# Patient Record
Sex: MALE | Race: WHITE | NOT HISPANIC OR LATINO | Employment: UNEMPLOYED | URBAN - METROPOLITAN AREA
[De-identification: names, ages, dates, MRNs, and addresses within clinical notes are randomized per-mention and may not be internally consistent; named-entity substitution may affect disease eponyms.]

---

## 2020-07-30 ENCOUNTER — OFFICE VISIT (OUTPATIENT)
Dept: URBAN - METROPOLITAN AREA CLINIC 35 | Facility: CLINIC | Age: 30
End: 2020-07-30

## 2020-07-30 VITALS
BODY MASS INDEX: 21.48 KG/M2 | HEIGHT: 69 IN | OXYGEN SATURATION: 98 % | WEIGHT: 145 LBS | TEMPERATURE: 98.5 F | HEART RATE: 74 BPM | SYSTOLIC BLOOD PRESSURE: 112 MMHG | DIASTOLIC BLOOD PRESSURE: 60 MMHG

## 2020-07-30 RX ORDER — MULTIVITAMIN
1 TABLET TABLET ORAL ONCE A DAY
Qty: 30 | Status: ACTIVE | COMMUNITY

## 2020-07-30 RX ORDER — B-COMPLEX WITH VITAMIN C
AS DIRECTED TABLET ORAL
Status: ACTIVE | COMMUNITY

## 2020-07-30 RX ORDER — L.ACID,FERM,PLA,RHA/B.BIF,LONG 126 MG
AS DIRECTED TABLET, DELAYED AND EXTENDED RELEASE ORAL
Status: ACTIVE | COMMUNITY

## 2020-07-30 NOTE — HPI-MIGRATED HPI
;   ;     Change in bowel habits : Patient states his bowel habits changed two years ago.  He now has loose stools.  The only change he can recall is not smoking and not drinking anymore. He will have 2-3 BMs a day.;   Diarrhea : Patient is a 30 y/o  male who presents today for a consultation for diarrhea. He admits symptoms began 2 years ago.  He reports 3 bowel movements a day with no strain. Stools alternate between semi formed and watery. He admits that his stools are thin. He denies any episodes of rectal bleeding but admits mucus present. He admits that he has added fiber to his diet and it has mildly helped symptoms. He reports fecal urgency and denies any fecal incontinece.   No abd pain with BMs.  He denies nocturnal diarrhea.  He feels like he cannot gain weight.  He will lose weight easily, and his appetite is low.  He does not have any normal stools.  Fiber has helped make stools larger but not firmer.  He feels he has had loose stools for two years.  Patient reports prior to 2 years ago he would have 1-2 bowel movements a day with normal formed stools.   ;

## 2020-07-30 NOTE — EXAM-MIGRATED EXAMINATIONS
GENERAL APPEARANCE: - alert, in no acute distress, well developed, well nourished;   HEAD: - normocephalic, atraumatic;   EYES: - sclera anicteric bilaterally;   EARS: - normal;   ORAL CAVITY: - mucosa moist, MP2;   THROAT: - clear;   NECK/THYROID: - neck supple, full range of motion, no cervical lymphadenopathy, no thyroid nodules, no thyromegaly, trachea midline;   LYMPH NODES: - no cervical adenopathy, no supraclavicular adenopathy, no periumbilical adenopathy;   SKIN: - no suspicious lesions, warm and dry, no spider angiomata, palmar erythema or icterus;   HEART: - no murmurs, regular rate and rhythm, S1, S2 normal;   LUNGS: - clear to auscultation bilaterally, good air movement, no wheezes, rales, rhonchi;   ABDOMEN: - bowel sounds present, no masses palpable, no organomegaly , no rebound tenderness, soft, nontender, nondistended;   RECTAL: - deferred by patient;   MUSCULOSKELETAL: - normal posture, normal gait and station, no decreased range of motion;   EXTREMITIES: - no clubbing, cyanosis, or edema;   NEUROLOGIC: - cranial nerves 2-12 grossly intact;   PSYCH: - cooperative with exam, mood/affect full range;

## 2020-08-20 ENCOUNTER — OFFICE VISIT (OUTPATIENT)
Dept: URBAN - METROPOLITAN AREA CLINIC 35 | Facility: CLINIC | Age: 30
End: 2020-08-20

## 2020-09-04 ENCOUNTER — TELEPHONE ENCOUNTER (OUTPATIENT)
Dept: URBAN - METROPOLITAN AREA CLINIC 35 | Facility: CLINIC | Age: 30
End: 2020-09-04

## 2020-09-04 ENCOUNTER — OFFICE VISIT (OUTPATIENT)
Dept: URBAN - METROPOLITAN AREA CLINIC 35 | Facility: CLINIC | Age: 30
End: 2020-09-04

## 2020-09-04 VITALS
SYSTOLIC BLOOD PRESSURE: 105 MMHG | BODY MASS INDEX: 22.22 KG/M2 | WEIGHT: 150 LBS | OXYGEN SATURATION: 97 % | HEART RATE: 70 BPM | HEIGHT: 69 IN | DIASTOLIC BLOOD PRESSURE: 60 MMHG

## 2020-09-04 RX ORDER — MULTIVITAMIN
1 TABLET TABLET ORAL ONCE A DAY
Qty: 30 | Status: ACTIVE | COMMUNITY

## 2020-09-04 RX ORDER — SODIUM PICOSULFATE, MAGNESIUM OXIDE, AND ANHYDROUS CITRIC ACID 10; 3.5; 12 MG/160ML; G/160ML; G/160ML
160 ML LIQUID ORAL
Qty: 1 KIT | Refills: 0 | OUTPATIENT
Start: 2020-09-04

## 2020-09-04 RX ORDER — B-COMPLEX WITH VITAMIN C
AS DIRECTED TABLET ORAL
Status: ACTIVE | COMMUNITY

## 2020-09-04 RX ORDER — L.ACID,FERM,PLA,RHA/B.BIF,LONG 126 MG
AS DIRECTED TABLET, DELAYED AND EXTENDED RELEASE ORAL
Status: ACTIVE | COMMUNITY

## 2020-09-04 NOTE — HPI-MIGRATED HPI
;   ;     Change in bowel habits : Assciated labs and stool study were done. Continues to amdit change in bowel habits. Denies relief with FODMAP diet. He still admits 2-3 bowel movments a day with loose stools.   Last visit (07/30/2020)                     Patient states his bowel habits changed two years ago.  He now has loose stools.  The only change he can recall is not smoking and not drinking anymore. He will have 2-3 BMs a day.;   Diarrhea : Associated labs and stool study were done. Admits some relief of fecal urgency with low FODMAP diet since this Monday, 08/31/2020. Admits 2-3 loose stools a day.   Patient denies bloating/gas, nausea, vomiting, abdominal pain, rectal bleeding, melena, mucus, or blood in stool, nocturnal diarrhea, weight loss, fecal incontinence, recent foreign travels, abx in past 3-6 months.  Patient never had colonoscopy. Patient did not have imaging test.  Last visit (07/30/2020)                     Patient is a 28 y/o  male who presents today for a consultation for diarrhea. He admits symptoms began 2 years ago.  He reports 3 bowel movements a day with no strain. Stools alternate between semi formed and watery. He admits that his stools are thin. He denies any episodes of rectal bleeding but admits mucus present. He admits that he has added fiber to his diet and it has mildly helped symptoms. He reports fecal urgency and denies any fecal incontinence.   No abd pain with BMs.  He denies nocturnal diarrhea.  He feels like he cannot gain weight.  He will lose weight easily, and his appetite is low.  He does not have any normal stools.  Fiber has helped make stools larger but not firmer.  He feels he has had loose stools for two years.  Patient reports prior to 2 years ago he would have 1-2 bowel movements a day with normal formed stools.   ;

## 2020-09-04 NOTE — EXAM-MIGRATED EXAMINATIONS
GENERAL APPEARANCE: - pleasant, well nourished, well developed, in no acute distress;   ORAL CAVITY: - mucosa moist, MP2;   HEART: - S1, S2 normal, regular rate and rhythm;   LUNGS: - clear to auscultation bilaterally;   ABDOMEN: - soft, nontender, nondistended, no rebound tenderness, bowel sounds present;

## 2020-11-05 ENCOUNTER — OFFICE VISIT (OUTPATIENT)
Dept: URBAN - METROPOLITAN AREA CLINIC 35 | Facility: CLINIC | Age: 30
End: 2020-11-05

## 2020-11-09 ENCOUNTER — TELEPHONE ENCOUNTER (OUTPATIENT)
Dept: URBAN - METROPOLITAN AREA CLINIC 35 | Facility: CLINIC | Age: 30
End: 2020-11-09

## 2020-11-09 RX ORDER — SODIUM PICOSULFATE, MAGNESIUM OXIDE, AND ANHYDROUS CITRIC ACID 10; 3.5; 12 MG/160ML; G/160ML; G/160ML
160 ML LIQUID ORAL
Qty: 1 KIT | Refills: 0 | OUTPATIENT
Start: 2020-11-09

## 2020-11-11 ENCOUNTER — OFFICE VISIT (OUTPATIENT)
Dept: URBAN - METROPOLITAN AREA SURGERY CENTER 8 | Facility: SURGERY CENTER | Age: 30
End: 2020-11-11

## 2020-11-30 ENCOUNTER — TELEPHONE ENCOUNTER (OUTPATIENT)
Dept: URBAN - METROPOLITAN AREA CLINIC 35 | Facility: CLINIC | Age: 30
End: 2020-11-30

## 2020-12-03 ENCOUNTER — DASHBOARD ENCOUNTERS (OUTPATIENT)
Age: 30
End: 2020-12-03

## 2020-12-03 ENCOUNTER — OFFICE VISIT (OUTPATIENT)
Dept: URBAN - METROPOLITAN AREA CLINIC 35 | Facility: CLINIC | Age: 30
End: 2020-12-03

## 2020-12-03 VITALS
BODY MASS INDEX: 22.07 KG/M2 | DIASTOLIC BLOOD PRESSURE: 80 MMHG | OXYGEN SATURATION: 99 % | SYSTOLIC BLOOD PRESSURE: 115 MMHG | WEIGHT: 149 LBS | HEART RATE: 92 BPM | HEIGHT: 69 IN

## 2020-12-03 PROBLEM — 398050005 DIVERTICULAR DISEASE OF COLON: Status: ACTIVE | Noted: 2020-12-03

## 2020-12-03 PROBLEM — 197125005 IRRITABLE BOWEL SYNDROME WITH DIARRHEA: Status: ACTIVE | Noted: 2020-12-03

## 2020-12-03 RX ORDER — B-COMPLEX WITH VITAMIN C
AS DIRECTED TABLET ORAL
Status: ACTIVE | COMMUNITY

## 2020-12-03 RX ORDER — L.ACID,FERM,PLA,RHA/B.BIF,LONG 126 MG
AS DIRECTED TABLET, DELAYED AND EXTENDED RELEASE ORAL
Status: ACTIVE | COMMUNITY

## 2020-12-03 RX ORDER — MULTIVITAMIN
1 TABLET TABLET ORAL ONCE A DAY
Qty: 30 | Status: ACTIVE | COMMUNITY

## 2020-12-03 RX ORDER — SODIUM PICOSULFATE, MAGNESIUM OXIDE, AND ANHYDROUS CITRIC ACID 10; 3.5; 12 MG/160ML; G/160ML; G/160ML
160 ML LIQUID ORAL
Qty: 1 KIT | Refills: 0 | Status: DISCONTINUED | COMMUNITY
Start: 2020-09-04

## 2020-12-03 RX ORDER — SODIUM PICOSULFATE, MAGNESIUM OXIDE, AND ANHYDROUS CITRIC ACID 10; 3.5; 12 MG/160ML; G/160ML; G/160ML
160 ML LIQUID ORAL
Qty: 1 KIT | Refills: 0 | Status: DISCONTINUED | COMMUNITY
Start: 2020-11-09

## 2020-12-03 NOTE — HPI-MIGRATED HPI
;   ;     Change in bowel habits : Colonoscopy performed on 11/11/2020. Patient denies any complications after the procedure. He still admits 2-4 bowel movements a day with loose stools.  Last visit (09/04/2020)           Assciated labs and stool study were done. Continues to amdit change in bowel habits. Denies relief with FODMAP diet. He still admits 2-3 bowel movments a day with loose stools.   Last visit (07/30/2020)                     Patient states his bowel habits changed two years ago.  He now has loose stools.  The only change he can recall is not smoking and not drinking anymore. He will have 2-3 BMs a day.;   Diarrhea : Patient is a 30-year-old  male, who presents today to follow up s/p colonoscopy. Colonoscopy performed on 11/11/2020. Patient denies any complications after the procedure. He was recommended to follow FODMAP diet after the procedure but he has not started yet.  He tried it prior to procedure but noticed no changes for two weeks. He still admits 2-4 bowel movements a day with loose stools. Patient states no changes since last visit. He admits to gas, but not bloating.  Denies rectal bleeding, melena, mucus, or blood in stool, nocturnal diarrhea, weight loss, fecal incontinence, recent foreign travels,   Last visit (09/04/2020)           Associated labs and stool study were done. Admits some relief of fecal urgency with low FODMAP diet since this Monday, 08/31/2020. Admits 2-3 loose stools a day.   Patient denies bloating/gas, nausea, vomiting, abdominal pain, rectal bleeding, melena, mucus, or blood in stool, nocturnal diarrhea, weight loss, fecal incontinence, recent foreign travels, abx in past 3-6 months.  Patient never had colonoscopy. Patient did not have imaging test.  Last visit (07/30/2020)                     Patient is a 30 y/o  male who presents today for a consultation for diarrhea. He admits symptoms began 2 years ago.  He reports 3 bowel movements a day with no strain. Stools alternate between semi formed and watery. He admits that his stools are thin. He denies any episodes of rectal bleeding but admits mucus present. He admits that he has added fiber to his diet and it has mildly helped symptoms. He reports fecal urgency and denies any fecal incontinence.   No abd pain with BMs.  He denies nocturnal diarrhea.  He feels like he cannot gain weight.  He will lose weight easily, and his appetite is low.  He does not have any normal stools.  Fiber has helped make stools larger but not firmer.  He feels he has had loose stools for two years.  Patient reports prior to 2 years ago he would have 1-2 bowel movements a day with normal formed stools.   ;

## 2021-02-04 ENCOUNTER — OFFICE VISIT (OUTPATIENT)
Dept: URBAN - METROPOLITAN AREA CLINIC 35 | Facility: CLINIC | Age: 31
End: 2021-02-04